# Patient Record
Sex: MALE | Race: OTHER | HISPANIC OR LATINO | URBAN - METROPOLITAN AREA
[De-identification: names, ages, dates, MRNs, and addresses within clinical notes are randomized per-mention and may not be internally consistent; named-entity substitution may affect disease eponyms.]

---

## 2023-01-09 ENCOUNTER — EMERGENCY (EMERGENCY)
Facility: HOSPITAL | Age: 33
LOS: 1 days | Discharge: HOME | End: 2023-01-09
Attending: EMERGENCY MEDICINE
Payer: SELF-PAY

## 2023-01-09 VITALS
TEMPERATURE: 98 F | WEIGHT: 156.09 LBS | HEART RATE: 83 BPM | SYSTOLIC BLOOD PRESSURE: 142 MMHG | OXYGEN SATURATION: 98 % | RESPIRATION RATE: 18 BRPM | DIASTOLIC BLOOD PRESSURE: 80 MMHG

## 2023-01-09 LAB
HAV IGM SER-ACNC: SIGNIFICANT CHANGE UP
HBV CORE IGM SER-ACNC: SIGNIFICANT CHANGE UP
HBV SURFACE AG SER-ACNC: SIGNIFICANT CHANGE UP
HCV AB S/CO SERPL IA: 0.09 S/CO — SIGNIFICANT CHANGE UP (ref 0–0.99)
HCV AB SERPL-IMP: SIGNIFICANT CHANGE UP
HIV 1 & 2 AB SERPL IA.RAPID: SIGNIFICANT CHANGE UP
T PALLIDUM AB TITR SER: NEGATIVE — SIGNIFICANT CHANGE UP

## 2023-01-09 PROCEDURE — 99053 MED SERV 10PM-8AM 24 HR FAC: CPT

## 2023-01-09 PROCEDURE — 80074 ACUTE HEPATITIS PANEL: CPT

## 2023-01-09 PROCEDURE — 36415 COLL VENOUS BLD VENIPUNCTURE: CPT

## 2023-01-09 PROCEDURE — 99283 EMERGENCY DEPT VISIT LOW MDM: CPT | Mod: 25

## 2023-01-09 PROCEDURE — 86780 TREPONEMA PALLIDUM: CPT

## 2023-01-09 PROCEDURE — 99284 EMERGENCY DEPT VISIT MOD MDM: CPT

## 2023-01-09 PROCEDURE — 86703 HIV-1/HIV-2 1 RESULT ANTBDY: CPT

## 2023-01-09 PROCEDURE — 96372 THER/PROPH/DIAG INJ SC/IM: CPT

## 2023-01-09 RX ORDER — ACETAMINOPHEN 500 MG
650 TABLET ORAL ONCE
Refills: 0 | Status: COMPLETED | OUTPATIENT
Start: 2023-01-09 | End: 2023-01-09

## 2023-01-09 RX ORDER — CEFTRIAXONE 500 MG/1
500 INJECTION, POWDER, FOR SOLUTION INTRAMUSCULAR; INTRAVENOUS ONCE
Refills: 0 | Status: COMPLETED | OUTPATIENT
Start: 2023-01-09 | End: 2023-01-09

## 2023-01-09 RX ADMIN — Medication 100 MILLIGRAM(S): at 05:27

## 2023-01-09 RX ADMIN — Medication 650 MILLIGRAM(S): at 05:27

## 2023-01-09 RX ADMIN — Medication 650 MILLIGRAM(S): at 06:00

## 2023-01-09 RX ADMIN — CEFTRIAXONE 500 MILLIGRAM(S): 500 INJECTION, POWDER, FOR SOLUTION INTRAMUSCULAR; INTRAVENOUS at 05:27

## 2023-01-09 NOTE — ED PROVIDER NOTE - HEME LYMPH, MLM
Oriented - self; Oriented - place; Oriented - time No adenopathy or splenomegaly. No cervical or inguinal lymphadenopathy.

## 2023-01-09 NOTE — ED ADULT TRIAGE NOTE - CHIEF COMPLAINT QUOTE
I have testicular itchiness and penile blisters x 1 year. I went to a clinic and was prescribed medications which is not helping.

## 2023-01-09 NOTE — ED PROVIDER NOTE - PATIENT PORTAL LINK FT
You can access the FollowMyHealth Patient Portal offered by [facility] by registering at the following website: [email or web address]. By joining [facility]’s FollowMyHealth portal, you will also be able to view your health information using other applications (apps) compatible with our system.

## 2023-01-09 NOTE — ED PROVIDER NOTE - CLINICAL SUMMARY MEDICAL DECISION MAKING FREE TEXT BOX
Patient's symptoms and signs are consistent with tinea cruris however patient is requesting STD testing and treatment. Patient's symptoms and signs are consistent with tinea cruris however patient is requesting STD testing and treatment.  Pt is well appearing, has no new complaints and able to walk with normal gait. Pt is stable for discharge and follow up with medical doctor. Pt educated on care and need for follow up. Discussed anticipatory guidance and return precautions. Questions answered. I had a detailed discussion with the patient and/or guardian regarding the historical points, exam findings, and any diagnostic results supporting the discharge diagnosis.

## 2023-01-09 NOTE — ED PROVIDER NOTE - OBJECTIVE STATEMENT
33-year-old male chief complaint of pruritic rash to penile shaft and groin area x 6 -7 months.  Patient denies penile discharge.  Patient also states that his last sexual intercourse was about 6 to 7 months ago.  Patient requesting STD diagnostics and treatment.

## 2023-01-09 NOTE — ED PROVIDER NOTE - NSFOLLOWUPCLINICS_GEN_ALL_ED_FT
[facility]  Internal Medicine  [address] [place] [address]  Phone: [phone]  Fax: [phone]

## 2023-01-09 NOTE — ED ADULT NURSE NOTE - OBJECTIVE STATEMENT
As per  #[id number] Kleber pt stated he has pimple on both testicles and his penis for 6 months that is itchy and has whitish discharge and the itchy feeling sometimes going to his rectum
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
## 2023-01-09 NOTE — ED PROVIDER NOTE - NSFOLLOWUPINSTRUCTIONS_ED_ALL_ED_FT
Tiña inguinal    [place] Itch      La tiña inguinal (tinea cruris) es nani infección de la piel en la ashly de la letha que es causada por un hongo. La tiña inguinal causa nani erupción cutánea que produce picazón en la letha y la parte superior de los muslos. Por lo general, desaparece en 2 a 3 semanas de tratamiento.      ¿Cuáles son las causas?    El hongo que causa la tiña inguinal se puede transmitir:  •Al tocar nani infección por hongos en otra parte del cuerpo, samy pie de atleta, y luego tocar la ashly de la letha.      •Al compartir toallas o ropa, samy medias o zapatos, con alguien que tenga nani infección por hongos.        ¿Qué incrementa el riesgo?    La tiña inguinal es más frecuente en los hombres y los varones adolescentes. También es más probable que desarrolle la afección si:  •Está en un clima caluroso y húmedo.      •Usa ropa ajustada o trajes de baño mojados leander largos períodos de tiempo.      •Practica deportes.      •Tiene sobrepeso.      •Tiene diabetes.      •Tiene debilitado el sistema de defensa del organismo (sistema inmunitario).      •Suda mucho.        ¿Cuáles son los signos o síntomas?  Outline of a male's lower body with a close-up view of a rash in the groin area.    Los síntomas de la tiña inguinal pueden incluir:  •Erupción cutánea sheila, rosada o marrón en la ashly de la letha. Puede stella ampollas. La erupción puede extenderse a los muslos, la abertura entre las nalgas (ano) y las nalgas.      •Piel seca y escamosa dentro o alrededor de la erupción.      •Picazón.        ¿Cómo se diagnostica?    En la mayoría de los casos, el médico puede hacer el diagnóstico observando la erupción cutánea. En algunos casos, se lyle nani muestra de piel infectada mediante raspado. Esta muestra se puede examinar con un microscopio (biopsia) o intentando desarrollar el hongo a partir de la muestra (cultivo).      ¿Cómo se trata?    El tratamiento de esta afección puede incluir lo siguiente:  •Medicamentos antimicóticos para destruir el hongo. Estos pueden ser nani crema, ungüento o polvo para la piel, o puede ser un medicamento que se lyle por boca (por vía oral).      •Crema o ungüento para la piel para reducir la picazón.      •Cambios en el estilo de catherine, samy el uso de ropa más holgada y el cuidado de la piel.        Siga estas indicaciones en castle casa:    Cuidado de la piel     •Aplíquese cremas, ungüentos o polvos para la piel exactamente samy se lo haya indicado el médico.      •Use ropa holgada que no se roce contra la ashly de la letha. Los hombres deben usar calzoncillos o ropa interior holgada.    •Mantenga la ashly de la letha limpia y seca.  •Cámbiese la ropa interior todos los días.      •Cámbiese los trajes de baño mojados lo más pronto posible.      •Después del baño, use nani toalla aparte para secar la ashly de la letha con suavidad y en castle totalidad. Usar nani toalla aparte ayudará a prevenir la propagación de la infección a otras zonas del cuerpo.        •Evite katerina y duchas calientes. [place] puede empeorar la picazón.      • No se rasque la ashly afectada.      Indicaciones generales     •Use y aplíquese los medicamentos de venta jluis y los recetados solamente samy se lo haya indicado el médico.      • No comparta toallas, ropa ni elementos personales con otras personas.      •Lávese las raleigh frecuentemente con agua y jabón leander al menos 20 segundos, en especial después de tocarse la ashly de la letha. Use desinfectante para raleigh si no dispone de agua y jabón.    •Cuando esté en el gimnasio:  •Use siempre calzado, especialmente en la ducha y alrededor de la piscina.      •Mantenga los roman cubiertos.      •Desinfecte cualquier alfombra o equipo antes de usarlos.      •Dúchese inmediatamente después del entrenamiento.        •Concurra a todas las visitas de seguimiento. [place] es importante.        Comuníquese con un médico si:  •La erupción cutánea:  •Empeora o no mejora luego de 2 semanas de tratamiento.      •Se extiende.      •Se vuelve a manifestar nani vez finalizado el tratamiento.      •Tiene alguno de los siguientes síntomas:  •Fiebre.      •Enrojecimiento, hinchazón o dolor alrededor de la erupción cutánea, nuevos o empeoramiento de los ya existentes.      •Líquido, aman o pus que salen de la erupción cutánea.          Resumen    •La tiña inguinal (tinea cruris) es nani infección por hongos de la piel en la ashly de la letha.      •El hongo se puede propagar al compartir ropa o al tocar nani infección por hongos en otra parte del cuerpo y luego tocar la ashly de la letha.      •El tratamiento puede incluir medicamentos antifúngicos y cambios en el estilo de catherine, samy mantener la ashly limpia y seca.      Esta información no tiene samy fin reemplazar el consejo del médico. Asegúrese de hacerle al médico cualquier pregunta que tenga.

## 2023-01-09 NOTE — ED PROVIDER NOTE - PHYSICAL EXAMINATION
No distress  : No penile discharge, erythematous small papules to inner groin area and penile shaft, no vesicles, no ulcers.